# Patient Record
Sex: MALE | HISPANIC OR LATINO
[De-identification: names, ages, dates, MRNs, and addresses within clinical notes are randomized per-mention and may not be internally consistent; named-entity substitution may affect disease eponyms.]

---

## 2020-10-09 ENCOUNTER — APPOINTMENT (OUTPATIENT)
Dept: PHYSICAL MEDICINE AND REHAB | Facility: CLINIC | Age: 40
End: 2020-10-09
Payer: COMMERCIAL

## 2020-10-09 DIAGNOSIS — Z87.09 PERSONAL HISTORY OF OTHER DISEASES OF THE RESPIRATORY SYSTEM: ICD-10-CM

## 2020-10-09 DIAGNOSIS — M51.26 OTHER INTERVERTEBRAL DISC DISPLACEMENT, LUMBAR REGION: ICD-10-CM

## 2020-10-09 DIAGNOSIS — Z78.9 OTHER SPECIFIED HEALTH STATUS: ICD-10-CM

## 2020-10-09 DIAGNOSIS — M75.41 IMPINGEMENT SYNDROME OF RIGHT SHOULDER: ICD-10-CM

## 2020-10-09 DIAGNOSIS — Z80.9 FAMILY HISTORY OF MALIGNANT NEOPLASM, UNSPECIFIED: ICD-10-CM

## 2020-10-09 PROBLEM — Z00.00 ENCOUNTER FOR PREVENTIVE HEALTH EXAMINATION: Status: ACTIVE | Noted: 2020-10-09

## 2020-10-09 PROCEDURE — 99204 OFFICE O/P NEW MOD 45 MIN: CPT

## 2020-10-09 PROCEDURE — 72100 X-RAY EXAM L-S SPINE 2/3 VWS: CPT

## 2020-10-09 RX ORDER — FLUTICASONE PROPIONATE AND SALMETEROL XINAFOATE 115; 21 UG/1; UG/1
115-21 AEROSOL, METERED RESPIRATORY (INHALATION)
Refills: 0 | Status: ACTIVE | COMMUNITY

## 2020-10-09 RX ORDER — MELOXICAM 15 MG/1
15 TABLET ORAL DAILY
Qty: 20 | Refills: 0 | Status: ACTIVE | COMMUNITY
Start: 2020-10-09 | End: 1900-01-01

## 2020-10-09 RX ORDER — ALBUTEROL SULFATE 4 MG/1
TABLET ORAL
Refills: 0 | Status: ACTIVE | COMMUNITY

## 2020-10-09 RX ORDER — FLUTICASONE PROPIONATE 50 MCG
SPRAY, SUSPENSION NASAL
Refills: 0 | Status: ACTIVE | COMMUNITY

## 2020-10-09 NOTE — DATA REVIEWED
[Plain X-Rays] : plain X-Rays [FreeTextEntry1] : Office x-rays of the lumbar spine AP lateral show mild L4-5 and moderate to severe L5-S1 DDD

## 2020-10-09 NOTE — PHYSICAL EXAM
[FreeTextEntry1] : BOGDAN is a 40 year male \par Constitutional: healthy appearing, NAD, and normal body habitus\par \par LUMBAR\par ROM: flexion to 40 deg, ext normal\par \par Gait: normal\par \par Inspection: no erythema, warmth\par Spine: no TTP in spinous process, SI joint, sacrum\par Bony palpation: no TTP in GT\par \par Soft tissue palpation hip: no TTP in gluteus dajuan, medius\par Soft tissue palpation of spine: no TTP in lumbar paraspinals\par \par 5/5 bilateral HF, KE, DF, PF \par sensation intact in bilat LE\par reflexes: knee and ankle 2+ bilat\par \par Special tests: neg seated SLR\par \par right SHOULDER:\par abd to 120 deg, flexion to 170 deg\par neg empty can, mild +Speed

## 2020-10-09 NOTE — REVIEW OF SYSTEMS
[Headache] : headache [Dizziness] : no dizziness [Fainting] : no fainting [Difficulty Walking] : no difficulty walking [Suicidal] : not suicidal [Insomnia] : no insomnia [Anxiety] : anxiety [Depression] : no depression [Negative] : Heme/Lymph

## 2020-10-09 NOTE — HISTORY OF PRESENT ILLNESS
[FreeTextEntry1] : Location: back\par Quality: sharp\par Severity: 6/10 but better after lying on floor\par Duration: 1 day\par Timing: acute, no hx of LBP\par Context: lifting things and playing a lot of volleyball\par Aggravating Factors: lifting\par Alleviating Factors: rest, lying on back\par Associated Symptoms: denies weight loss, fever, chills, change in bowel/bladder habits, redness, warmth, weakness, numbness/tingling, radiation down legs\par \par Right shoulder hurting for few wk.  Playing a lot of volleyball lately.  Pain with overhead activity.

## 2020-10-09 NOTE — ASSESSMENT
[FreeTextEntry1] : too busy for PT so gave exercises to do.  Denies problems with nsaids. \par \par Avoid back flexion.  Limit sitting.  Ice back often.  Lift things properly.\par \par f/u 1 month

## 2021-06-22 ENCOUNTER — APPOINTMENT (OUTPATIENT)
Dept: PHYSICAL MEDICINE AND REHAB | Facility: CLINIC | Age: 41
End: 2021-06-22
Payer: COMMERCIAL

## 2021-06-22 PROCEDURE — 99072 ADDL SUPL MATRL&STAF TM PHE: CPT

## 2021-06-22 PROCEDURE — 99214 OFFICE O/P EST MOD 30 MIN: CPT

## 2021-06-22 NOTE — PHYSICAL EXAM
[FreeTextEntry1] : 41 year yo male in NAD and normal body habitus\par \par \par Gait - normal\par \par left KNEE\par very mild swelling, no erythema, warmth; tattoos all over his leg\par flexion to 120 deg with pain, ext normal\par no TTP in patellar and quad tendon, medial/lateral joint\par 5/5 knee ext\par neg Lachman, Balwinder, valgus/varus laxity\par pain with valgus stress\par

## 2021-06-22 NOTE — ASSESSMENT
[FreeTextEntry1] : Ice area often.  Can play as tolerated but limit side to side movements.  \par \par Get better shoes since plays on concrete.

## 2021-06-22 NOTE — HISTORY OF PRESENT ILLNESS
[FreeTextEntry1] : Location: left medial knee\par Severity: moderate at times\par Duration: 3 wk\par Timing: acute\par Context: playing more volleyball; twisted knee when stepped in crack\par Aggravating Factors: twisting\par Alleviating Factors: rest, ice\par Associated Symptoms: denies weight loss, fever, chills, redness, warmth, \par

## 2021-10-14 ENCOUNTER — APPOINTMENT (OUTPATIENT)
Dept: PHYSICAL MEDICINE AND REHAB | Facility: CLINIC | Age: 41
End: 2021-10-14

## 2021-10-15 ENCOUNTER — APPOINTMENT (OUTPATIENT)
Dept: PHYSICAL MEDICINE AND REHAB | Facility: CLINIC | Age: 41
End: 2021-10-15
Payer: COMMERCIAL

## 2021-10-15 DIAGNOSIS — S83.412A SPRAIN OF MEDIAL COLLATERAL LIGAMENT OF LEFT KNEE, INITIAL ENCOUNTER: ICD-10-CM

## 2021-10-15 DIAGNOSIS — M17.11 UNILATERAL PRIMARY OSTEOARTHRITIS, RIGHT KNEE: ICD-10-CM

## 2021-10-15 PROCEDURE — 73562 X-RAY EXAM OF KNEE 3: CPT | Mod: 50

## 2021-10-15 PROCEDURE — 99213 OFFICE O/P EST LOW 20 MIN: CPT

## 2021-10-15 RX ORDER — MELOXICAM 7.5 MG/1
7.5 TABLET ORAL
Qty: 30 | Refills: 0 | Status: ACTIVE | COMMUNITY
Start: 2021-10-15 | End: 1900-01-01

## 2021-10-15 NOTE — HISTORY OF PRESENT ILLNESS
[FreeTextEntry1] : Location: left medial knee\par Severity: mild\par Duration: few months\par Context: playing more volleyball; twisted knee when stepped in crack\par Aggravating Factors: twisting\par Alleviating Factors: rest, ice\par Associated Symptoms: denies weight loss, fever, chills, redness, warmth, \par \par Right knee hurting lately.  He had left plantar fasciitis so was walking with a limp.  He reports some swelling before but no redness, warmth.  Pain with bending knee.  Better with rest.

## 2021-10-15 NOTE — DATA REVIEWED
[FreeTextEntry1] : office xrays of the right knee AP and lateral and sunrise show slight narrowing of the medial compartment compared to lateral but still has 5.3 mm of space.  No other gross abnormalities.

## 2021-10-15 NOTE — PHYSICAL EXAM
[FreeTextEntry1] : 41 year yo male in NAD and normal body habitus\par \par \par Gait - antalgic\par \par left KNEE\par no swelling, no erythema, warmth; tattoos all over his leg\par flexion to 120 deg with pain, ext normal\par no TTP in patellar and quad tendon, medial/lateral joint\par \par \par right KNEE\par \par KNEE\par no swelling, erythema, warmth\par flexion to 110 deg, lacks few deg of ext\par no TTP in patellar and quad tendon, medial/lateral joint\par 5/5 knee ext\par neg Lachman, Balwinder, valgus/varus laxity\par

## 2022-06-08 NOTE — ASSESSMENT
[FreeTextEntry1] : MCL - continue HEP\par \par Right knee pain likely from walking awkwardly from plantar fasciitis.  Taught knee stretch.  Can use knee brace prn.  If not better with PT, consider knee injection. \par \par 
none known

## 2023-01-03 ENCOUNTER — APPOINTMENT (OUTPATIENT)
Dept: ORTHOPEDIC SURGERY | Facility: CLINIC | Age: 43
End: 2023-01-03
Payer: COMMERCIAL

## 2023-01-03 DIAGNOSIS — M77.11 LATERAL EPICONDYLITIS, RIGHT ELBOW: ICD-10-CM

## 2023-01-03 PROCEDURE — 99203 OFFICE O/P NEW LOW 30 MIN: CPT

## 2023-01-03 PROCEDURE — 73070 X-RAY EXAM OF ELBOW: CPT | Mod: RT

## 2023-01-03 NOTE — PHYSICAL EXAM
[de-identified] : RIGHT elbow\par \par Constitutional: \par The patient is healthy-appearing and in no apparent distress. \par \par Cardiovascular System: \par There is capillary refill less than 2 seconds. \par \par Skin: \par There is no skin abnormalities of elbow.\par \par RIGHT Elbow: \par Appearance: \par There is no deformity, induration, redness, swelling, or warmth and a normal carrying angle. \par \par Bony Palpation: \par There is no tenderness of the medial epicondyle.\par There is no tenderness of olecranon.\par There is no tenderness of the ulnatrochlea articulation.\par There is no tenderness of the coronoid process.\par There is no tenderness of the radial head.\par There is no tenderness of the radiocapitellar joint.\par There is tenderness of the lateral epicondyle. \par \par Soft Tissue Palpation: \par There is no tenderness of the ulnar nerve.\par There is no tenderness of the biceps insertion.\par There is no tenderness of the pronator teres.\par There is no tenderness of the flexor carpi ulnaris.\par There is no tenderness of the flexor carpi radialis.\par There is no tenderness of the annular ligament of the radius.\par There is no tenderness of the brachioradialis.\par There is no tenderness of the radial collateral ligament.\par There is no tenderness of the ulnar collateral ligament.\par There is no tenderness of the antecubital fossa.\par There is tenderness of the extensor carpi radialis brevis.\par There is tenderness of the extensor carpi radialis longus.\par \par Range of Motion:  \par There is full range of motion both actively and passively. \par \par Stability:\par There is no dislocation or laxity to testing.\par  \par Strength: \par There is 5/5 elbow flexion, extension, supination and pronation.  \par \par Neurologic:\par There is normal sensation C5-T1 to light touch. \par \par Psychiatric: \par The patient demonstrates a normal mood and affect and is active and alert.\par  [de-identified] : Given patient's reported history and physical examination, x-ray evaluation ( as listed below ) was ordered and performed to aid in diagnosis and treatment of the patient.\par X-ray right elbow.  There is no significant bony / soft tissue abnormality, arthritis, or fracture.

## 2023-01-03 NOTE — ASSESSMENT
[FreeTextEntry1] : Discussed at length with patient regarding symptoms and diagnosis.  Treatment options discussed and patient's questions answered and patient expresses understanding. \par \par Patient offered cortisone injection, but declines at this time.  Patient offered physical therapy but declined and elects home exercises/observation only.  Patient to follow-up in office if persistent or recurrent symptoms. Patient instructed to call if any questions or concerns.\par

## 2023-01-03 NOTE — HISTORY OF PRESENT ILLNESS
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

## 2023-04-04 ENCOUNTER — APPOINTMENT (OUTPATIENT)
Dept: PHYSICAL MEDICINE AND REHAB | Facility: CLINIC | Age: 43
End: 2023-04-04
Payer: COMMERCIAL

## 2023-04-04 DIAGNOSIS — M17.12 UNILATERAL PRIMARY OSTEOARTHRITIS, LEFT KNEE: ICD-10-CM

## 2023-04-04 PROCEDURE — 73562 X-RAY EXAM OF KNEE 3: CPT | Mod: LT

## 2023-04-04 PROCEDURE — 99213 OFFICE O/P EST LOW 20 MIN: CPT

## 2023-04-04 NOTE — PHYSICAL EXAM
[FreeTextEntry1] : 43 year old male in NAD and normal body habitus\par \par \par Gait - normal\par \par left KNEE\par no swelling, erythema, warmth\par flexion to 110 deg, ext normal\par no TTP in patellar and quad tendon, medial/lateral joint\par 5/5 knee ext\par \par

## 2023-04-04 NOTE — ASSESSMENT
[FreeTextEntry1] : Discussed diagnosis and treatment plan including PT.\par Taught squats and lunges.  \par Ice area often.\par Discussed injections if not better. \par Try to lose a few pounds. \par \par f/u 1 month

## 2023-04-04 NOTE — HISTORY OF PRESENT ILLNESS
[FreeTextEntry1] : Location: left medial knee\par Severity: 5/10\par Duration: couple years but worse last 3 wk\par Context: playing more volleyball; twisted knee when stepped in crack\par Aggravating Factors: twisting\par Alleviating Factors: rest, ice\par Associated Symptoms: denies weight loss, fever, chills, redness, warmth, \par \par

## 2025-01-30 ENCOUNTER — APPOINTMENT (OUTPATIENT)
Dept: ORTHOPEDIC SURGERY | Facility: CLINIC | Age: 45
End: 2025-01-30
Payer: COMMERCIAL

## 2025-01-30 DIAGNOSIS — M17.11 UNILATERAL PRIMARY OSTEOARTHRITIS, RIGHT KNEE: ICD-10-CM

## 2025-01-30 PROCEDURE — 73562 X-RAY EXAM OF KNEE 3: CPT | Mod: RT

## 2025-01-30 PROCEDURE — 99213 OFFICE O/P EST LOW 20 MIN: CPT

## 2025-01-30 RX ORDER — NABUMETONE 500 MG/1
500 TABLET, FILM COATED ORAL
Qty: 60 | Refills: 1 | Status: ACTIVE | COMMUNITY
Start: 2025-01-30 | End: 1900-01-01